# Patient Record
Sex: FEMALE | Race: WHITE
[De-identification: names, ages, dates, MRNs, and addresses within clinical notes are randomized per-mention and may not be internally consistent; named-entity substitution may affect disease eponyms.]

---

## 2023-03-05 ENCOUNTER — HOSPITAL ENCOUNTER (EMERGENCY)
Dept: HOSPITAL 54 - ER | Age: 83
Discharge: HOME | End: 2023-03-05
Payer: COMMERCIAL

## 2023-03-05 VITALS — HEIGHT: 60 IN | BODY MASS INDEX: 25.52 KG/M2 | WEIGHT: 130 LBS

## 2023-03-05 VITALS — SYSTOLIC BLOOD PRESSURE: 133 MMHG | DIASTOLIC BLOOD PRESSURE: 68 MMHG
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
## 2023-03-05 DIAGNOSIS — I10: ICD-10-CM
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
## 2023-03-05 DIAGNOSIS — E78.00: ICD-10-CM

## 2023-03-05 DIAGNOSIS — S01.81XA: Primary | ICD-10-CM

## 2023-03-05 NOTE — NUR
-------------------------------------------------------------------------------

          *** Note undone in [facility] - 03/05/23 at 1655 by HALI ***           

-------------------------------------------------------------------------------

PT RETURNED TO CT VIA ANGELITA

## 2023-03-05 NOTE — NUR
[facility] [id number] FOR TRIP AND FALL ON BUS STOP. PT APPEARS TO HAVE SUSTAINED 
LACERATION ON FOREHEAD. PT DENIES LOC. PT IS [id number]. TRANSFERRED TO BED AND 
CONNECTED TO MONITOR. BREATHING EVEN AND UNLABORED. AWAITING MD ORDERS.